# Patient Record
Sex: FEMALE | Race: WHITE | Employment: OTHER | URBAN - METROPOLITAN AREA
[De-identification: names, ages, dates, MRNs, and addresses within clinical notes are randomized per-mention and may not be internally consistent; named-entity substitution may affect disease eponyms.]

---

## 2019-04-09 ENCOUNTER — TELEPHONE (OUTPATIENT)
Dept: ORTHOPEDIC SURGERY | Age: 74
End: 2019-04-09

## 2019-04-09 ENCOUNTER — OFFICE VISIT (OUTPATIENT)
Dept: ORTHOPEDIC SURGERY | Age: 74
End: 2019-04-09
Payer: MEDICARE

## 2019-04-09 VITALS
DIASTOLIC BLOOD PRESSURE: 79 MMHG | HEART RATE: 82 BPM | WEIGHT: 165 LBS | HEIGHT: 57 IN | SYSTOLIC BLOOD PRESSURE: 134 MMHG | BODY MASS INDEX: 35.6 KG/M2

## 2019-04-09 DIAGNOSIS — M17.12 PRIMARY OSTEOARTHRITIS OF LEFT KNEE: ICD-10-CM

## 2019-04-09 DIAGNOSIS — M25.561 RIGHT KNEE PAIN, UNSPECIFIED CHRONICITY: Primary | ICD-10-CM

## 2019-04-09 DIAGNOSIS — M25.562 LEFT KNEE PAIN, UNSPECIFIED CHRONICITY: ICD-10-CM

## 2019-04-09 PROCEDURE — 20610 DRAIN/INJ JOINT/BURSA W/O US: CPT | Performed by: ORTHOPAEDIC SURGERY

## 2019-04-09 PROCEDURE — 1123F ACP DISCUSS/DSCN MKR DOCD: CPT | Performed by: ORTHOPAEDIC SURGERY

## 2019-04-09 PROCEDURE — G8417 CALC BMI ABV UP PARAM F/U: HCPCS | Performed by: ORTHOPAEDIC SURGERY

## 2019-04-09 PROCEDURE — 1036F TOBACCO NON-USER: CPT | Performed by: ORTHOPAEDIC SURGERY

## 2019-04-09 PROCEDURE — G8427 DOCREV CUR MEDS BY ELIG CLIN: HCPCS | Performed by: ORTHOPAEDIC SURGERY

## 2019-04-09 PROCEDURE — 99213 OFFICE O/P EST LOW 20 MIN: CPT | Performed by: ORTHOPAEDIC SURGERY

## 2019-04-09 PROCEDURE — G8400 PT W/DXA NO RESULTS DOC: HCPCS | Performed by: ORTHOPAEDIC SURGERY

## 2019-04-09 PROCEDURE — 4040F PNEUMOC VAC/ADMIN/RCVD: CPT | Performed by: ORTHOPAEDIC SURGERY

## 2019-04-09 PROCEDURE — 3017F COLORECTAL CA SCREEN DOC REV: CPT | Performed by: ORTHOPAEDIC SURGERY

## 2019-04-09 PROCEDURE — 1090F PRES/ABSN URINE INCON ASSESS: CPT | Performed by: ORTHOPAEDIC SURGERY

## 2019-04-09 RX ORDER — LOSARTAN POTASSIUM 100 MG/1
100 TABLET ORAL DAILY
Refills: 3 | COMMUNITY
Start: 2019-02-21

## 2019-04-09 NOTE — TELEPHONE ENCOUNTER
04/09/2019  EUFLEXXA  (SERIES OF 3)  LEFT KNEE. NO AUTHORIZATION REQUIRED. CAN BUY& BILL. PER MEDICARE GUIDELINES.  AP

## 2019-04-09 NOTE — PROGRESS NOTES
EUFLEXXA INJECTION    NDC: [address]-09    LOT # [id number]    EXP. DATE: 03/10/2020  SITE: Lt. Knee     We provided the Euflexxa medication. This is injection #1of 3.

## 2019-04-09 NOTE — PROGRESS NOTES
judgment normal. ______          Assessment   Vital Signs:      Vitals:    04/09/19 1003   BP: 134/79   Pulse:        left Knee shows evidence for DJD with  obvious pseudolaxity, pain with weight bearing, antalgic gait and palpable osteophytes. Inspection: Moderate anterior swelling. Swelling is present with    effusion. The posterior aspect of the knee appears to be full with tenderness. There is no erythema, rash, or ecchymosis. Range of Motion:  Right 0-120 left0-120     Pain with varus testing    There is deformitymod varus    Strength:  Hamstrings rated: 4/5. Quadriceps rated: 5/5    Palpation: There is moderate tenderness along the medial joint line. Special Tests: Patellar Compression test is positive. Valgus & Varus test is positive. Skin: There are no rashes, ulcerations or lesions. Gait: Gait pattern is antalgic  Skin shows no rashes/ecchymosis to the affected area, no hyperesthesias, no discoloration, no temperature or color discrepancies. NEUROLOGICALLY: There is no evidence for sensory or motor deficits in the extremity. Coordination appears full with no spacticity or rigidity. Reflexes appear to be symmetric. Distal circulation intact. No signs of RSD. Additional Comments: back issues with stiffness lower lumbar. Procedure(s): The patient is symptomatic from osteoarthritis of the Right knee joint with documented radiological signs of arthritis. The patient has also failed 3 months of conservative treatment including home exercise, education, Tylenol and/or NSAIDs use. The patient was offered a Visco supplementation today. Risks, benefits, and alternatives to the injections were discussed in detail with the patient. The risks discussed included but are not limited to infection, skin reactions, hot swollen joints, and anaphylaxis. The patient gave verbal informed consent for the injection.  The patient's skin was prepped with  3 sterile gauze  pads soaked with alcohol solution and the Right knee joint was injected with 2 mL of Euflexxa intra-articularly under sterile conditions. The patient tolerated the injection reasonably well. The patient was given instructions to ice the knee and avoid strenuous activities for 24-48 hours. The patient was instructed to call the office immediately if there is increased pain, redness, warmth, fever, or chills. Diagnostic Test Findings:   Xray   Have reviewed the xrays above from 04/09/19   and my impression is:medial knee osteoarthrits. Ap/pa late and sunruese with primary medial left knee osteoarthrits. Right total knee stable on three views       Assessment and Plan:       Diagnosis Orders   1. Right knee pain, unspecified chronicity  XR KNEE RIGHT (3 VIEWS)   2. Left knee pain, unspecified chronicity  XR KNEE LEFT (MIN 4 VIEWS)              The orders below, if any, were placed during this visit:   Orders Placed This Encounter   Procedures    XR KNEE LEFT (MIN 4 VIEWS)     Order Specific Question:   Reason for exam:     Answer:   Pain RM 7    XR KNEE RIGHT (3 VIEWS)     Order Specific Question:   Reason for exam:     Answer:   Pain RM 7              Treatment Plan:   I discussed the diagnosis of arthritis with the patient. We've discussed treatment options which would include anti-inflammatory medication, physical therapy, bracing, cortisone injections, and Visco supplementation. We have also discussed the benefits of low impact exercise and weight loss. We have also discussed the possibility of joint replacement surgery in the future.            Ana Atkinson MD

## 2019-04-16 ENCOUNTER — OFFICE VISIT (OUTPATIENT)
Dept: ORTHOPEDIC SURGERY | Age: 74
End: 2019-04-16
Payer: MEDICARE

## 2019-04-16 VITALS
BODY MASS INDEX: 35.58 KG/M2 | DIASTOLIC BLOOD PRESSURE: 90 MMHG | SYSTOLIC BLOOD PRESSURE: 146 MMHG | HEART RATE: 75 BPM | HEIGHT: 57 IN | WEIGHT: 164.9 LBS

## 2019-04-16 DIAGNOSIS — M17.12 PRIMARY OSTEOARTHRITIS OF LEFT KNEE: Primary | ICD-10-CM

## 2019-04-16 PROCEDURE — 99024 POSTOP FOLLOW-UP VISIT: CPT | Performed by: ORTHOPAEDIC SURGERY

## 2019-04-16 PROCEDURE — 20610 DRAIN/INJ JOINT/BURSA W/O US: CPT | Performed by: ORTHOPAEDIC SURGERY

## 2019-04-16 NOTE — PROGRESS NOTES
EUFLEXXA INJECTION    NDC: [address]-22    LOT # [id number]    EXP. DATE: 03/17/2020  SITE: Lt. Knee    We provided the Euflexxa medication. This is injection #2 of 3.

## 2019-04-16 NOTE — PROGRESS NOTES
Patient Name: Yuki Zhou  : 1945  DOS: 2019        Chief Complaint:   Chief Complaint   Patient presents with    Knee Pain     LT Valdene [place] #2       History of Present Illness: Yuki Zhou is a 69 y.o. female who presents with a chief complaint of continued complaints of pain in the knee with irritation with walking, stairs and with overuse. Pain in the knee regularly with swelling and discomfort. Increase in pain over the past several months time. Medical History  Current Medications:   Prior to Admission medications    Medication Sig Start Date End Date Taking? Authorizing Provider   losartan (COZAAR) 100 MG tablet Take 100 mg by mouth daily 19   Historical Provider, MD   tafluprost (ZIOPTAN) 0.0015 % SOLN ophthalmic drops Inject 1 drop into the eye daily    Historical Provider, MD     Medical History:  has a past medical history of H/O: hysterectomy. Allergies: Allergies   Allergen Reactions    Codeine      Itching    Tramadol Itching     Made her feel loopy and itchy       Review of Systems:               Review of Systems ______  Constitutional: Negative for fever and diaphoresis. ____________  Respiratory: Negative for shortness of breath.  ________  Gastrointestinal: Negative for abdominal pain. ________  Musculoskeletal: Positive for joint pain. ____  Skin: Negative for itching. ____  Neurological: Negative for loss of consciousness. ______________          Physical Exam __  Constitutional: She is oriented to person, place, and time and well-developed, well-nourished, and in no distress. No distress. ____  HENT:   Head: Normocephalic and atraumatic. ____  Eyes: Conjunctivae are normal. ________  Cardiovascular: Intact distal pulses. ____  Pulmonary/Chest: Effort normal. ________________________  Neurological: She is alert and oriented to person, place, and time. ____  Skin: Skin is dry.  She is not diaphoretic. ____  Psychiatric: Mood, affect and judgment normal. ______          Assessment   Vital Signs:      Vitals:    04/16/19 1219   BP: (!) 146/90   Pulse:        left Knee shows evidence for DJD with  obvious pseudolaxity, pain with weight bearing, antalgic gait and palpable osteophytes. Inspection: Moderate anterior swelling. Swelling is present with    effusion. The posterior aspect of the knee appears to be full with tenderness. There is no erythema, rash, or ecchymosis. Range of Motion:  Right 0-120 left0-120     Pain with varus testing    There is deformitymod varus    Strength:  Hamstrings rated: 4/5. Quadriceps rated: 5/5    Palpation: There is moderate tenderness along the medial joint line. Special Tests: Patellar Compression test is positive. Valgus & Varus test is positive. Skin: There are no rashes, ulcerations or lesions. Gait: Gait pattern is antalgic  Skin shows no rashes/ecchymosis to the affected area, no hyperesthesias, no discoloration, no temperature or color discrepancies. NEUROLOGICALLY: There is no evidence for sensory or motor deficits in the extremity. Coordination appears full with no spacticity or rigidity. Reflexes appear to be symmetric. Distal circulation intact. No signs of RSD. Additional Comments: back issues with stiffness lower lumbar. Procedure(s): The patient is symptomatic from osteoarthritis of the Right knee joint with documented radiological signs of arthritis. The patient has also failed 3 months of conservative treatment including home exercise, education, Tylenol and/or NSAIDs use. The patient was offered a Visco supplementation today. Risks, benefits, and alternatives to the injections were discussed in detail with the patient. The risks discussed included but are not limited to infection, skin reactions, hot swollen joints, and anaphylaxis. The patient gave verbal informed consent for the injection.  The patient's skin was prepped with  3 sterile gauze  pads soaked with alcohol solution and the Right knee joint was injected with 2 mL of Euflexxa intra-articularly under sterile conditions. The patient tolerated the injection reasonably well. The patient was given instructions to ice the knee and avoid strenuous activities for 24-48 hours. The patient was instructed to call the office immediately if there is increased pain, redness, warmth, fever, or chills. Diagnostic Test Findings:   Xray   Have reviewed the xrays above from 04/16/19   and my impression is:medial knee osteoarthrits. Ap/pa late and sunruese with primary medial left knee osteoarthrits. Right total knee stable on three views       Assessment and Plan:       Diagnosis Orders   1. Primary osteoarthritis of left knee  ID ARTHROCENTESIS ASPIR&/INJ MAJOR JT/BURSA W/O US    EUFLEXXA INJ PER DOSE              The orders below, if any, were placed during this visit:   Orders Placed This Encounter   Procedures    [place] ARTHROCENTESIS ASPIR&/INJ MAJOR JT/BURSA W/O US    EUFLEXXA INJ PER DOSE              Treatment Plan:   I discussed the diagnosis of arthritis with the patient. We've discussed treatment options which would include anti-inflammatory medication, physical therapy, bracing, cortisone injections, and Visco supplementation. We have also discussed the benefits of low impact exercise and weight loss. We have also discussed the possibility of joint replacement surgery in the future.            Wyatt Lambert MD

## 2019-04-24 ENCOUNTER — OFFICE VISIT (OUTPATIENT)
Dept: ORTHOPEDIC SURGERY | Age: 74
End: 2019-04-24
Payer: MEDICARE

## 2019-04-24 VITALS
DIASTOLIC BLOOD PRESSURE: 84 MMHG | SYSTOLIC BLOOD PRESSURE: 134 MMHG | WEIGHT: 164.9 LBS | BODY MASS INDEX: 35.58 KG/M2 | HEIGHT: 57 IN | HEART RATE: 73 BPM

## 2019-04-24 DIAGNOSIS — M17.12 PRIMARY OSTEOARTHRITIS OF LEFT KNEE: Primary | ICD-10-CM

## 2019-04-24 PROCEDURE — 20610 DRAIN/INJ JOINT/BURSA W/O US: CPT | Performed by: ORTHOPAEDIC SURGERY

## 2019-04-24 PROCEDURE — 99024 POSTOP FOLLOW-UP VISIT: CPT | Performed by: ORTHOPAEDIC SURGERY

## 2019-04-24 NOTE — PROGRESS NOTES
EUFLEXXA INJECTION    NDC: [address]-00    LOT # [id number]    EXP. DATE: 03/17/2020  SITE: Lt. Knee    We provided the Euflexxa medication. This is injection #3 of 3.

## 2019-04-24 NOTE — PROGRESS NOTES
Patient Name: Jovana Dorsey  : 1945  DOS: 2019        Chief Complaint:   Chief Complaint   Patient presents with    Knee Pain     LEFT Doritasonal [place] #3       History of Present Illness: Jovana Dorsey is a 69 y.o. female who presents with a chief complaint of continued complaints of pain in the knee with irritation with walking, stairs and with overuse. Pain in the knee regularly with swelling and discomfort. Increase in pain over the past several months time. Pain better overall with injection series. Medical History  Current Medications:   Prior to Admission medications    Medication Sig Start Date End Date Taking? Authorizing Provider   losartan (COZAAR) 100 MG tablet Take 100 mg by mouth daily 19   Historical Provider, MD   tafluprost (ZIOPTAN) 0.0015 % SOLN ophthalmic drops Inject 1 drop into the eye daily    Historical Provider, MD     Medical History:  has a past medical history of H/O: hysterectomy. Allergies: Allergies   Allergen Reactions    Codeine      Itching    Tramadol Itching     Made her feel loopy and itchy       Review of Systems:               Review of Systems ______  Constitutional: Negative for fever and diaphoresis. ____________  Respiratory: Negative for shortness of breath.  ________  Gastrointestinal: Negative for abdominal pain. ________  Musculoskeletal: Positive for joint pain. ____  Skin: Negative for itching. ____  Neurological: Negative for loss of consciousness. ______________          Physical Exam __  Constitutional: She is oriented to person, place, and time and well-developed, well-nourished, and in no distress. No distress. ____  HENT:   Head: Normocephalic and atraumatic. ____  Eyes: Conjunctivae are normal. ________  Cardiovascular: Intact distal pulses. ____  Pulmonary/Chest: Effort normal. ________________________  Neurological: She is alert and oriented to person, place, and time. ____  Skin: Skin is dry. She is not diaphoretic. ____  Psychiatric: Mood, affect and judgment normal. ______          Assessment   Vital Signs:      Vitals:    04/24/19 1007   BP: 134/84   Pulse: 73       left Knee shows evidence for DJD with  obvious pseudolaxity, pain with weight bearing, antalgic gait and palpable osteophytes. Inspection: Moderate anterior swelling. Swelling is present with    effusion. The posterior aspect of the knee appears to be full with tenderness. There is no erythema, rash, or ecchymosis. Range of Motion:  Right 0-120 left0-120     Pain with varus testing    There is deformitymod varus    Strength:  Hamstrings rated: 4/5. Quadriceps rated: 5/5    Palpation: There is moderate tenderness along the medial joint line. Special Tests: Patellar Compression test is positive. Valgus & Varus test is positive. Skin: There are no rashes, ulcerations or lesions. Gait: Gait pattern is antalgic  Skin shows no rashes/ecchymosis to the affected area, no hyperesthesias, no discoloration, no temperature or color discrepancies. NEUROLOGICALLY: There is no evidence for sensory or motor deficits in the extremity. Coordination appears full with no spacticity or rigidity. Reflexes appear to be symmetric. Distal circulation intact. No signs of RSD. Additional Comments: back issues with stiffness lower lumbar. Procedure(s): The patient is symptomatic from osteoarthritis of the Right knee joint with documented radiological signs of arthritis. The patient has also failed 3 months of conservative treatment including home exercise, education, Tylenol and/or NSAIDs use. The patient was offered a Visco supplementation today. Risks, benefits, and alternatives to the injections were discussed in detail with the patient. The risks discussed included but are not limited to infection, skin reactions, hot swollen joints, and anaphylaxis. The patient gave verbal informed consent for the injection.  The patient's skin was prepped with  3 sterile gauze  pads soaked with alcohol solution and the Right knee joint was injected with 2 mL of Euflexxa intra-articularly under sterile conditions. The patient tolerated the injection reasonably well. The patient was given instructions to ice the knee and avoid strenuous activities for 24-48 hours. The patient was instructed to call the office immediately if there is increased pain, redness, warmth, fever, or chills. Diagnostic Test Findings:   Xray   Have reviewed the xrays above from 04/24/19   and my impression is:medial knee osteoarthrits. Ap/pa late and sunruese with primary medial left knee osteoarthrits. Right total knee stable on three views       Assessment and Plan:       Diagnosis Orders   1. Primary osteoarthritis of left knee  [place] ARTHROCENTESIS ASPIR&/INJ MAJOR JT/BURSA W/O US    EUFLEXXA INJ PER DOSE              The orders below, if any, were placed during this visit:   Orders Placed This Encounter   Procedures    [place] ARTHROCENTESIS ASPIR&/INJ MAJOR JT/BURSA W/O US    EUFLEXXA INJ PER DOSE              Treatment Plan:   I discussed the diagnosis of arthritis with the patient. We've discussed treatment options which would include anti-inflammatory medication, physical therapy, bracing, cortisone injections, and Visco supplementation. We have also discussed the benefits of low impact exercise and weight loss. We have also discussed the possibility of joint replacement surgery in the future.            Emma Su MD

## 2019-05-22 ENCOUNTER — OFFICE VISIT (OUTPATIENT)
Dept: ORTHOPEDIC SURGERY | Age: 74
End: 2019-05-22
Payer: MEDICARE

## 2019-05-22 VITALS
HEIGHT: 57 IN | BODY MASS INDEX: 35.58 KG/M2 | DIASTOLIC BLOOD PRESSURE: 81 MMHG | WEIGHT: 164.9 LBS | SYSTOLIC BLOOD PRESSURE: 126 MMHG | HEART RATE: 71 BPM

## 2019-05-22 DIAGNOSIS — M25.562 LEFT KNEE PAIN, UNSPECIFIED CHRONICITY: ICD-10-CM

## 2019-05-22 DIAGNOSIS — M17.12 PRIMARY OSTEOARTHRITIS OF LEFT KNEE: Primary | ICD-10-CM

## 2019-05-22 PROCEDURE — 3017F COLORECTAL CA SCREEN DOC REV: CPT | Performed by: ORTHOPAEDIC SURGERY

## 2019-05-22 PROCEDURE — G8417 CALC BMI ABV UP PARAM F/U: HCPCS | Performed by: ORTHOPAEDIC SURGERY

## 2019-05-22 PROCEDURE — 4040F PNEUMOC VAC/ADMIN/RCVD: CPT | Performed by: ORTHOPAEDIC SURGERY

## 2019-05-22 PROCEDURE — 20610 DRAIN/INJ JOINT/BURSA W/O US: CPT | Performed by: ORTHOPAEDIC SURGERY

## 2019-05-22 PROCEDURE — G8427 DOCREV CUR MEDS BY ELIG CLIN: HCPCS | Performed by: ORTHOPAEDIC SURGERY

## 2019-05-22 PROCEDURE — 99214 OFFICE O/P EST MOD 30 MIN: CPT | Performed by: ORTHOPAEDIC SURGERY

## 2019-05-22 PROCEDURE — G8400 PT W/DXA NO RESULTS DOC: HCPCS | Performed by: ORTHOPAEDIC SURGERY

## 2019-05-22 PROCEDURE — 1123F ACP DISCUSS/DSCN MKR DOCD: CPT | Performed by: ORTHOPAEDIC SURGERY

## 2019-05-22 PROCEDURE — 1090F PRES/ABSN URINE INCON ASSESS: CPT | Performed by: ORTHOPAEDIC SURGERY

## 2019-05-22 PROCEDURE — 1036F TOBACCO NON-USER: CPT | Performed by: ORTHOPAEDIC SURGERY

## 2019-05-22 RX ORDER — OXYCODONE HYDROCHLORIDE AND ACETAMINOPHEN 5; 325 MG/1; MG/1
1 TABLET ORAL EVERY 6 HOURS PRN
Qty: 12 TABLET | Refills: 0 | Status: SHIPPED | OUTPATIENT
Start: 2019-05-22 | End: 2019-05-29

## 2019-05-22 NOTE — PROGRESS NOTES
Patient Name: Lexi Wiley  : 1945  DOS: 2019        Chief Complaint:   Chief Complaint   Patient presents with    Knee Pain     Left knee       History of Present Illness: Lexi Wiley is a 69 y.o. female who presents with a chief complaint of continued complaints of pain in the knee with irritation with walking, stairs and with overuse. Pain in the knee regularly with swelling and discomfort. Increase in pain over the past several months time. Pain better overall with injection series. Medical History  Current Medications:   Prior to Admission medications    Medication Sig Start Date End Date Taking? Authorizing Provider   oxyCODONE-acetaminophen (PERCOCET) 5-325 MG per tablet Take 1 tablet by mouth every 6 hours as needed for Pain for up to 7 days. Intended supply: 7 days. Take lowest dose possible to manage pain 19 Yes Ashleigh Miller MD   losartan (COZAAR) 100 MG tablet Take 100 mg by mouth daily 19   Historical Provider, MD   tafluprost (ZIOPTAN) 0.0015 % SOLN ophthalmic drops Inject 1 drop into the eye daily    Historical Provider, MD     Medical History:  has a past medical history of H/O: hysterectomy. Allergies: Allergies   Allergen Reactions    Codeine      Itching    Tramadol Itching     Made her feel loopy and itchy       Review of Systems:               Review of Systems ______  Constitutional: Negative for fever and diaphoresis. ____________  Respiratory: Negative for shortness of breath.  ________  Gastrointestinal: Negative for abdominal pain. ________  Musculoskeletal: Positive for joint pain. ____  Skin: Negative for itching. ____  Neurological: Negative for loss of consciousness. ______________          Physical Exam __  Constitutional: She is oriented to person, place, and time and well-developed, well-nourished, and in no distress. No distress.  ____  HENT:   Head: Normocephalic and atraumatic. ____  Eyes: Conjunctivae are normal. isopropyl alcohol and a dressing was applied. Complications:  None; patient tolerated the procedure well. Diagnostic Test Findings:   Xray   Have reviewed the xrays above from previously  and my impression is:medial knee osteoarthrits. Ap/pa late and sunruese with primary medial left knee osteoarthrits. Right total knee stable on three views       Assessment and Plan:       Diagnosis Orders   1. Primary osteoarthritis of left knee  [place] ARTHROCENTESIS ASPIR&/INJ MAJOR JT/BURSA W/O US    [place] TRIAMCINOLONE ACETONIDE INJ    oxyCODONE-acetaminophen (PERCOCET) 5-325 MG per tablet   2. Left knee pain, unspecified chronicity  [place] ARTHROCENTESIS ASPIR&/INJ MAJOR JT/BURSA W/O US    [place] TRIAMCINOLONE ACETONIDE INJ    oxyCODONE-acetaminophen (PERCOCET) 5-325 MG per tablet              The orders below, if any, were placed during this visit:   Orders Placed This Encounter   Procedures    [place] ARTHROCENTESIS ASPIR&/INJ MAJOR JT/BURSA W/O US    [place] TRIAMCINOLONE ACETONIDE INJ              Treatment Plan:   I discussed the diagnosis of arthritis with the patient. We've discussed treatment options which would include anti-inflammatory medication, physical therapy, bracing, cortisone injections, and Visco supplementation. We have also discussed the benefits of low impact exercise and weight loss. We have also discussed the possibility of joint replacement surgery in the future. Currently knee is doing much better with recent gel injections. Cortisone injection and pain medications to allow for additional mobilization for [place].             Rene Herrera MD

## 2022-03-15 ENCOUNTER — CLINICAL DOCUMENTATION (OUTPATIENT)
Dept: OTHER | Age: 77
End: 2022-03-15